# Patient Record
Sex: FEMALE | Race: WHITE
[De-identification: names, ages, dates, MRNs, and addresses within clinical notes are randomized per-mention and may not be internally consistent; named-entity substitution may affect disease eponyms.]

---

## 2017-02-28 ENCOUNTER — HOSPITAL ENCOUNTER (OUTPATIENT)
Dept: HOSPITAL 17 - PLAB | Age: 77
End: 2017-02-28
Attending: FAMILY MEDICINE
Payer: MEDICARE

## 2017-02-28 DIAGNOSIS — I10: ICD-10-CM

## 2017-02-28 DIAGNOSIS — E11.9: ICD-10-CM

## 2017-02-28 DIAGNOSIS — D50.9: ICD-10-CM

## 2017-02-28 DIAGNOSIS — E78.2: Primary | ICD-10-CM

## 2017-02-28 LAB
ALP SERPL-CCNC: 62 U/L (ref 45–117)
ALT SERPL-CCNC: 29 U/L (ref 10–53)
ANION GAP SERPL CALC-SCNC: 8 MEQ/L (ref 5–15)
AST SERPL-CCNC: 29 U/L (ref 15–37)
BASOPHILS # BLD AUTO: 0 TH/MM3 (ref 0–0.2)
BASOPHILS NFR BLD: 0.6 % (ref 0–2)
BILIRUB SERPL-MCNC: 0.5 MG/DL (ref 0.2–1)
BUN SERPL-MCNC: 24 MG/DL (ref 7–18)
CHLORIDE SERPL-SCNC: 102 MEQ/L (ref 98–107)
EOSINOPHIL # BLD: 0.2 TH/MM3 (ref 0–0.4)
EOSINOPHIL NFR BLD: 3.1 % (ref 0–4)
ERYTHROCYTE [DISTWIDTH] IN BLOOD BY AUTOMATED COUNT: 14.1 % (ref 11.6–17.2)
GFR SERPLBLD BASED ON 1.73 SQ M-ARVRAT: 45 ML/MIN (ref 89–?)
HCO3 BLD-SCNC: 28.6 MEQ/L (ref 21–32)
HCT VFR BLD CALC: 39.4 % (ref 35–46)
HDLC SERPL-MCNC: 45.6 MG/DL (ref 40–60)
HEMO FLAGS: (no result)
HEMOGLOBIN A1A: 1 %
HEMOGLOBIN A1B: 2 %
HEMOGLOBIN AO: 83.1 %
HEMOGLOBIN LA1C: 2.5 %
HEMOGLOBIN P3: 4.5 %
LDLC SERPL-MCNC: 136 MG/DL (ref 0–99)
LYMPHOCYTES # BLD AUTO: 1.7 TH/MM3 (ref 1–4.8)
LYMPHOCYTES NFR BLD AUTO: 22.5 % (ref 9–44)
MCH RBC QN AUTO: 31 PG (ref 27–34)
MCHC RBC AUTO-ENTMCNC: 33.9 % (ref 32–36)
MCV RBC AUTO: 91.6 FL (ref 80–100)
MONOCYTES NFR BLD: 8.5 % (ref 0–8)
NEUTROPHILS # BLD AUTO: 5 TH/MM3 (ref 1.8–7.7)
NEUTROPHILS NFR BLD AUTO: 65.3 % (ref 16–70)
PLATELET # BLD: 251 TH/MM3 (ref 150–450)
POTASSIUM SERPL-SCNC: 4 MEQ/L (ref 3.5–5.1)
RBC # BLD AUTO: 4.31 MIL/MM3 (ref 4–5.3)
SODIUM SERPL-SCNC: 139 MEQ/L (ref 136–145)
TRANSFERRIN IRON PROFILE: 261 MG/DL (ref 200–360)
WBC # BLD AUTO: 7.6 TH/MM3 (ref 4–11)

## 2017-02-28 PROCEDURE — 80053 COMPREHEN METABOLIC PANEL: CPT

## 2017-02-28 PROCEDURE — 83550 IRON BINDING TEST: CPT

## 2017-02-28 PROCEDURE — 85025 COMPLETE CBC W/AUTO DIFF WBC: CPT

## 2017-02-28 PROCEDURE — 83036 HEMOGLOBIN GLYCOSYLATED A1C: CPT

## 2017-02-28 PROCEDURE — 80061 LIPID PANEL: CPT

## 2017-02-28 PROCEDURE — 36415 COLL VENOUS BLD VENIPUNCTURE: CPT

## 2017-02-28 PROCEDURE — 83540 ASSAY OF IRON: CPT

## 2017-08-25 ENCOUNTER — HOSPITAL ENCOUNTER (OUTPATIENT)
Dept: HOSPITAL 17 - HECH | Age: 77
End: 2017-08-25
Attending: INTERNAL MEDICINE
Payer: MEDICARE

## 2017-08-25 DIAGNOSIS — R05: ICD-10-CM

## 2017-08-25 DIAGNOSIS — R06.02: ICD-10-CM

## 2017-08-25 DIAGNOSIS — R07.9: Primary | ICD-10-CM

## 2017-08-25 PROCEDURE — 94726 PLETHYSMOGRAPHY LUNG VOLUMES: CPT

## 2017-08-25 PROCEDURE — 94729 DIFFUSING CAPACITY: CPT

## 2017-08-25 PROCEDURE — 94060 EVALUATION OF WHEEZING: CPT

## 2017-08-25 PROCEDURE — 94620: CPT

## 2017-08-25 PROCEDURE — 93306 TTE W/DOPPLER COMPLETE: CPT

## 2017-08-25 NOTE — ECHRPT
Indication:   CHEST PAIN

 

 CONCLUSIONS

 The left ventricular systolic function is normal with an estimated ejection fraction in the range of
 60-65%. 

 Normal left ventricular size. 

 Mild concentric left ventricular hypertrophy. 

 No regional wall motion abnormalities are present. 

 There is mild tricuspid valve regurgitation. 

 The estimated pulmonary arterial pressure is 40 mmHg. 

 

 BP:        /         HR:                          Rhythm:           Sinus

 

 MEASUREMENTS  (Male / Female) Normal Values       Technical Quality:Technically difficult study

 2D ECHO

 LV Diastolic Diameter PLAX        4.3 cm                4.2 - 5.9 / 3.9 - 5.3 cm

 LV Systolic Diameter PLAX         3.1 cm                

 IVS Diastolic Thickness           1.5 cm                0.6 - 1.0 / 0.6 - 0.9 cm

 LVPW Diastolic Thickness          1.5 cm                0.6 - 1.0 / 0.6 - 0.9 cm

 LV Relative Wall Thickness        0.7                   

 LVOT Diameter                     2.0 cm                

 LV Ejection Fraction MOD 4C       62.3 %                

 LV Ejection Fraction 4C AL        63.3 %                

 

 M-MODE

 Aortic Root Diameter MM           2.8 cm                

 AV Cusp Separation MM             2.1 cm                

 

 DOPPLER

 AV Peak Velocity                  150.0 cm/s            

 AV Peak Gradient                  9.0 mmHg              

 LVOT Peak Velocity                117.0 cm/s            

 LVOT Peak Gradient                5.5 mmHg              

 AV Area Cont Eq pk                2.5 cm               

 MV Area PHT                       2.9 cm               

 Mitral E Point Velocity           53.3 cm/s             

 Mitral A Point Velocity           92.3 cm/s             

 Mitral E to A Ratio               0.6                   

 LV E' Lateral Velocity            5.7 cm/s              

 Mitral E to LV E' Lateral Ratio   9.4                   

 LV E' Septal Velocity             5.4 cm/s              

 Mitral E to LV E' Septal Ratio    9.9                   

 TR Peak Velocity                  276.0 cm/s            

 TR Peak Gradient                  30.5 mmHg             

 PV Peak Velocity                  153.0 cm/s            

 PV Peak Gradient                  9.4 mmHg              

 

 

 FINDINGS

 

 LEFT VENTRICLE

 The left ventricular systolic function is normal with an estimated ejection fraction in the range of
 60-65%. 

 Normal left ventricular size. 

 Mild concentric left ventricular hypertrophy. 

 No regional wall motion abnormalities are present. 

 

 RIGHT VENTRICLE

 Normal right ventricular size and systolic function.  

 

 LEFT ATRIUM

 The left atrial size is normal.  

 

 RIGHT ATRIUM

 The right atrial size is normal.  

 

 ATRIAL SEPTUM

 Normal atrial septal thickness without atrial level shunting by limited color doppler interrogation.
  

 

 AORTA

 The aortic root and proximal ascending aorta are normal in size on limited imaging.  

 

 MITRAL VALVE

 Structurally normal mitral valve. No mitral valve stenosis or regurgitation.  

 

 AORTIC VALVE

 Trileaflet aortic valve. No aortic valve stenosis or regurgitation.  

 

 TRICUSPID VALVE

 There is mild tricuspid valve regurgitation. 

 The estimated pulmonary arterial pressure is 40 mmHg. 

 

 PULMONARY VALVE

 The pulmonary valve is not well visualized.  

 

 VESSELS

 The inferior vena cava is normal in size.  

 

 PERICARDIUM

 No pericardial effusion.  

 

 

 

 

  Ari Reynolds MD

  (Electronically Signed)

  Final Date:25 August 2017 10:44

## 2017-08-28 NOTE — RSPPFT
DATE OF PROCEDURE:   8/25/17



COMMENTS:   



VOLUMES

DYNAMIC:    FVC and FEV1 mildly reduced.

STATIC:    TLC low normal; FRC and RV normal.

FLOWS:    FEV1% normal; FEF 25-75 moderately reduced.

DIFFUSION:    Severely reduced.

FLOW VOLUME

      LOOP:    Restrictive configuration with terminal airflow obstruction.  



IMPRESSION:    

   

Mild obstructive and mild restrictive ventilator defect with increased airways resistance 
but no improvement post-bronchodilator. No significant hyperinflation and diffusion is 
severely reduced.

## 2017-11-10 ENCOUNTER — HOSPITAL ENCOUNTER (OUTPATIENT)
Dept: HOSPITAL 17 - PLAB | Age: 77
End: 2017-11-10
Attending: FAMILY MEDICINE
Payer: MEDICARE

## 2017-11-10 DIAGNOSIS — I10: Primary | ICD-10-CM

## 2017-11-10 DIAGNOSIS — E11.9: ICD-10-CM

## 2017-11-10 DIAGNOSIS — D50.9: ICD-10-CM

## 2017-11-10 DIAGNOSIS — E78.2: ICD-10-CM

## 2017-11-10 LAB
ALP SERPL-CCNC: 85 U/L (ref 45–117)
ALT SERPL-CCNC: 29 U/L (ref 10–53)
ANION GAP SERPL CALC-SCNC: 7 MEQ/L (ref 5–15)
AST SERPL-CCNC: 30 U/L (ref 15–37)
BASOPHILS # BLD AUTO: 0.1 TH/MM3 (ref 0–0.2)
BASOPHILS NFR BLD: 1.1 % (ref 0–2)
BILIRUB SERPL-MCNC: 0.4 MG/DL (ref 0.2–1)
BUN SERPL-MCNC: 21 MG/DL (ref 7–18)
CHLORIDE SERPL-SCNC: 100 MEQ/L (ref 98–107)
EOSINOPHIL # BLD: 0.3 TH/MM3 (ref 0–0.4)
EOSINOPHIL NFR BLD: 3.3 % (ref 0–4)
ERYTHROCYTE [DISTWIDTH] IN BLOOD BY AUTOMATED COUNT: 14.2 % (ref 11.6–17.2)
GFR SERPLBLD BASED ON 1.73 SQ M-ARVRAT: 43 ML/MIN (ref 89–?)
HCO3 BLD-SCNC: 28.4 MEQ/L (ref 21–32)
HCT VFR BLD CALC: 41.2 % (ref 35–46)
HDLC SERPL-MCNC: 39.2 MG/DL (ref 40–60)
HEMO FLAGS: (no result)
HEMOGLOBIN A1A: 0.9 %
HEMOGLOBIN AO: 82.9 %
HEMOGLOBIN LA1C: 2.6 %
HEMOGLOBIN P3: 4.5 %
HGB F MFR BLD: 2.1 %
LDLC SERPL-MCNC: 176 MG/DL (ref 0–99)
LYMPHOCYTES # BLD AUTO: 1.6 TH/MM3 (ref 1–4.8)
LYMPHOCYTES NFR BLD AUTO: 16.4 % (ref 9–44)
MCH RBC QN AUTO: 30.2 PG (ref 27–34)
MCHC RBC AUTO-ENTMCNC: 32.2 % (ref 32–36)
MCV RBC AUTO: 93.7 FL (ref 80–100)
MONOCYTES NFR BLD: 9.4 % (ref 0–8)
NEUTROPHILS # BLD AUTO: 6.8 TH/MM3 (ref 1.8–7.7)
NEUTROPHILS NFR BLD AUTO: 69.8 % (ref 16–70)
PLATELET # BLD: 261 TH/MM3 (ref 150–450)
POTASSIUM SERPL-SCNC: 4.2 MEQ/L (ref 3.5–5.1)
RBC # BLD AUTO: 4.4 MIL/MM3 (ref 4–5.3)
SODIUM SERPL-SCNC: 135 MEQ/L (ref 136–145)
TRANSFERRIN IRON PROFILE: 250 MG/DL (ref 200–360)
WBC # BLD AUTO: 9.7 TH/MM3 (ref 4–11)

## 2017-11-10 PROCEDURE — 83036 HEMOGLOBIN GLYCOSYLATED A1C: CPT

## 2017-11-10 PROCEDURE — 83540 ASSAY OF IRON: CPT

## 2017-11-10 PROCEDURE — 80053 COMPREHEN METABOLIC PANEL: CPT

## 2017-11-10 PROCEDURE — 80061 LIPID PANEL: CPT

## 2017-11-10 PROCEDURE — 36415 COLL VENOUS BLD VENIPUNCTURE: CPT

## 2017-11-10 PROCEDURE — 85025 COMPLETE CBC W/AUTO DIFF WBC: CPT

## 2017-11-10 PROCEDURE — 83550 IRON BINDING TEST: CPT

## 2018-04-13 ENCOUNTER — HOSPITAL ENCOUNTER (EMERGENCY)
Dept: HOSPITAL 17 - PHEFT | Age: 78
Discharge: HOME | End: 2018-04-13
Payer: MEDICARE

## 2018-04-13 VITALS
SYSTOLIC BLOOD PRESSURE: 173 MMHG | HEART RATE: 75 BPM | DIASTOLIC BLOOD PRESSURE: 75 MMHG | RESPIRATION RATE: 18 BRPM | OXYGEN SATURATION: 94 % | TEMPERATURE: 97.6 F

## 2018-04-13 VITALS — WEIGHT: 156.53 LBS | BODY MASS INDEX: 28.8 KG/M2 | HEIGHT: 62 IN

## 2018-04-13 DIAGNOSIS — Z88.0: ICD-10-CM

## 2018-04-13 DIAGNOSIS — I10: ICD-10-CM

## 2018-04-13 DIAGNOSIS — Z79.899: ICD-10-CM

## 2018-04-13 DIAGNOSIS — M54.31: Primary | ICD-10-CM

## 2018-04-13 PROCEDURE — 99283 EMERGENCY DEPT VISIT LOW MDM: CPT

## 2018-04-13 PROCEDURE — 96372 THER/PROPH/DIAG INJ SC/IM: CPT

## 2018-04-13 PROCEDURE — 73502 X-RAY EXAM HIP UNI 2-3 VIEWS: CPT

## 2018-04-13 NOTE — PD
HPI


Chief Complaint:  Musculoskeletal Complaint


Time Seen by Provider:  11:41


Travel History


International Travel<30 days:  No


Contact w/Intl Traveler<30days:  No


Traveled to known affect area:  No





History of Present Illness


HPI


78-year-old female here with right leg pain 1 week.  She denies injury or 

trauma.  She reports the pain originated in her right buttocks region radiating 

down into the right thigh.  Denies paresthesia or weakness of the extremity.  

No fever chills.  No incontinence.  No saddle anesthesia.  No swelling of the 

leg.  She reports the pain as aching in nature worse with weightbearing and 

relieved with lying flat.  Symptom severity is moderate.  Improved with lying 

flat, resting, OTC Aleve.





PFSH


Past Medical History


Hypertension:  Yes


Thyroid Disease:  Yes


Influenza Vaccination:  Yes


Pregnant?:  Not Pregnant





Past Surgical History


Cholecystectomy:  Yes


Gynecologic Surgery:  Yes (LT MASTECTOMY)





Social History


Alcohol Use:  No


Tobacco Use:  No


Substance Use:  No





Allergies-Medications


(Allergen,Severity, Reaction):  


Coded Allergies:  


     cortisone (Unverified  Allergy, Intermediate, Hives, 4/13/18)


     penicillin G (Unverified  Allergy, Intermediate, Hives, 4/13/18)


     meperidine (Unverified  Adverse Reaction, Intermediate, AGITATION, 4/13/18)


Reported Meds & Prescriptions





Reported Meds & Active Scripts


Active


Ultram (Tramadol HCl) 50 Mg Tab 50 Mg PO Q6H PRN


Reported


Vitamin D (Cholecalciferol) 2,000 Unit Cap   


Vitamin B-12 (Cyanocobalamin) 1,000 Mcg Subl 1,000 Mcg SL DAILY


Preservision Areds (Multiple Vitamins W/ Minerals) 1 Tab 1 Tab PO DAILY


Omeprazole 40 Mg Cap 40 Mg  DAILY


Levothyroxine (Levothyroxine Sodium) 75 Mcg Tab 75 Mcg PO DAILY


Escitalopram (Escitalopram Oxalate) 20 Mg Tab 20 Mg PO DAILY


Proair Hfa 8.5 GM Inh (Albuterol Sulfate) 90 Mcg/Act Aer 1 Puff INH Q4H PRN


     108 mcg/actuation


Amlodipine (Amlodipine Besylate) 10 Mg Tab 10 Mg PO DAILY








Review of Systems


Except as stated in HPI:  all other systems reviewed are Neg


General / Constitutional:  No: Fever


Eyes:  No: Visual changes


HENT:  No: Headaches


Cardiovascular:  No: Chest Pain or Discomfort


Respiratory:  No: Shortness of Breath


Gastrointestinal:  No: Abdominal Pain


Genitourinary:  No: Dysuria





Physical Exam


Narrative


GENERAL: Alert and well-appearing 78-year-old female


SKIN: Warm and dry.


HEAD: Normocephalic.


EYES: No injection or drainage. 


NECK: Supple, trachea midline. No JVD or lymphadenopathy.


CARDIOVASCULAR: Regular rate and rhythm without murmurs, gallops, or rubs. 


RESPIRATORY: Breath sounds equal bilaterally. No accessory muscle use.


GASTROINTESTINAL: Abdomen soft, non-tender, nondistended. 


MUSCULOSKELETAL: No cyanosis, or edema.  Patient points to the right anterior/

lateral hip as the source of the pain.  The area is nontender.  No groin mass 

or tenderness.  Pelvis is stable.  Pain with flexion and external rotation of 

the hip.  No leg swelling.  No calf tenderness.  Pulses are present.  Normal 

sensation.  Brisk cap refill


BACK: Nontender without obvious deformity. No CVA tenderness. + TTP R SI joint








Data


Data


Last Documented VS





Vital Signs








  Date Time  Temp Pulse Resp B/P (MAP) Pulse Ox O2 Delivery O2 Flow Rate FiO2


 


4/13/18 11:24 97.6 75 18 173/75 (107) 94   








Orders





 Orders


Ketorolac Inj (Toradol Inj) (4/13/18 12:00)


Hip, Uni(Ap&Lat) W Ap Pelvis (4/13/18 )


Ed Discharge Order (4/13/18 12:54)








[facility]


Medical Decision Making


Medical Screen Exam Complete:  Yes


Emergency Medical Condition:  Yes


Differential Diagnosis


Sciatica, pelvis/hip fracture, hip strain


Narrative Course


78-year-old female here with pain in the right buttocks radiating into the 

thigh 7 days.  Extremities neurovascularly intact.  Pelvis is stable.  She was 

given a shot of Toradol and reports symptom improvement.  Patient has multiple 

allergies to medications.  Options for pain control was discussed at length 

with patient and family.  She will be given a prescription for Ultram.  She was 

encouraged to follow-up with her primary on Monday for recheck.  He verbalized 

understanding and agree to plan





Diagnosis





 Primary Impression:  


 Sciatica


 Qualified Codes:  M54.31 - Sciatica, right side


Referrals:  


Primary Care Physician





***Additional Instructions:  


Tylenol and Aleve as directed.


Ultram as needed for severe pain.


Follow-up with her primary doctor on Monday.


Return if he develop new or worsening symptoms.


Scripts


Tramadol (Ultram) 50 Mg Tab


50 MG PO Q6H Y for PAIN, #14 TAB 0 Refills


   Prov: Haley Clement         4/13/18


Disposition:  01 DISCHARGE HOME


Condition:  Stable











Haley Clement Apr 13, 2018 12:23

## 2018-04-13 NOTE — RADRPT
EXAM DATE/TIME:  04/13/2018 12:08 

 

[facility] COMPARISON:     

No previous studies available for comparison.

 

                     

INDICATIONS :     

Right hip pain with no known injury pain originating from buttocks radiating to groin.

                     

 

MEDICAL HISTORY :     

Hypertension.          

 

SURGICAL HISTORY :     

None.   

 

ENCOUNTER:     

Initial                                        

 

ACUITY:     

2 weeks      

 

PAIN SCORE:     

10/10

 

LOCATION:     

Right anterior hip

 

FINDINGS:     

Examination of the right hip was performed with AP Pelvis.  The primary and secondary trabecular conchis
talya of the femoral neck is intact.  The hip joint is of normal width without significant sclerosis or
 bony hypertrophy.  The acetabulum is grossly intact.

 

CONCLUSION:     Negative for fracture or dislocation. Follow up in 7-10 days is suggested if symptoms
 persist.

 

 

 Vic Nunez MD FACR on April 13, 2018 at 12:37           

Board Certified Radiologist.

 This report was verified electronically.